# Patient Record
Sex: MALE | Race: WHITE | NOT HISPANIC OR LATINO | Employment: FULL TIME | URBAN - METROPOLITAN AREA
[De-identification: names, ages, dates, MRNs, and addresses within clinical notes are randomized per-mention and may not be internally consistent; named-entity substitution may affect disease eponyms.]

---

## 2017-05-11 ENCOUNTER — OFFICE VISIT (OUTPATIENT)
Dept: INTERNAL MEDICINE | Facility: CLINIC | Age: 47
End: 2017-05-11
Payer: COMMERCIAL

## 2017-05-11 VITALS
HEART RATE: 109 BPM | BODY MASS INDEX: 37.84 KG/M2 | DIASTOLIC BLOOD PRESSURE: 86 MMHG | TEMPERATURE: 98.5 F | WEIGHT: 255.5 LBS | SYSTOLIC BLOOD PRESSURE: 140 MMHG | HEIGHT: 69 IN | OXYGEN SATURATION: 94 %

## 2017-05-11 DIAGNOSIS — M54.50 ACUTE LEFT-SIDED LOW BACK PAIN WITHOUT SCIATICA: Primary | ICD-10-CM

## 2017-05-11 PROCEDURE — 99213 OFFICE O/P EST LOW 20 MIN: CPT | Performed by: NURSE PRACTITIONER

## 2017-05-11 RX ORDER — CYCLOBENZAPRINE HCL 10 MG
5-10 TABLET ORAL 3 TIMES DAILY PRN
Qty: 30 TABLET | Refills: 1 | Status: SHIPPED | OUTPATIENT
Start: 2017-05-11

## 2017-05-11 NOTE — PATIENT INSTRUCTIONS
Apply ice for 15-20 minutes intermittently as needed and especially after any offending activity; a bag of frozen vegetables works well for a cold pack.    May use heat alternating with cold if that helps it feel better    Warm moist heat, such as a shower or bath, may help relax muscles    Ibuprofen 600 mg - 800 mg every 8 hours for the next 5-7 days. Take with food. Maximum dose 2400 mg a day. Then as needed for pain    Flexeril up to 3 times a day as needed for pain   You need to rest after taking   Do not drive for 5-6 hours after taking medication     If your pain persists, would consider additional imaging      Consider Physical Therapy if symptoms not better with symptomatic care.    Follow up with worsening symptoms, failure to improve, or with any questions or concerns.

## 2017-05-11 NOTE — MR AVS SNAPSHOT
After Visit Summary   5/11/2017    Miguelangel Wren    MRN: [id number]           Patient Information     Date Of Birth          1970        Visit Information        Provider Department      5/11/2017 10:00 AM Carmelita Ayers APRN CNP [facility]        Care Instructions    Apply ice for 15-20 minutes intermittently as needed and especially after any offending activity; a bag of frozen vegetables works well for a cold pack.    May use heat alternating with cold if that helps it feel better    Warm moist heat, such as a shower or bath, may help relax muscles    Ibuprofen 600 mg - 800 mg every 8 hours for the next 5-7 days. Take with food. Maximum dose 2400 mg a day. Then as needed for pain    Flexeril up to 3 times a day as needed for pain   You need to rest after taking   Do not drive for 5-6 hours after taking medication     If your pain persists, would consider additional imaging      Consider Physical Therapy if symptoms not better with symptomatic care.    Follow up with worsening symptoms, failure to improve, or with any questions or concerns.               Follow-ups after your visit        Who to contact     If you have questions or need follow up information about today's clinic visit or your schedule please contact [facility] directly at [phone].  Normal or non-critical lab and imaging results will be communicated to you by MyChart, letter or phone within 4 business days after the clinic has received the results. If you do not hear from us within 7 days, please contact the clinic through MyChart or phone. If you have a critical or abnormal lab result, we will notify you by phone as soon as possible.  Submit refill requests through [employer] or call your pharmacy and they will forward the refill request to us. Please allow 3 business days for your refill to be completed.          Additional Information About Your Visit        MyChart Information      "[employer] lets you send messages to your doctor, view your test results, renew your prescriptions, schedule appointments and more. To sign up, go to [email or web address] . Click on \"Log in\" on the left side of the screen, which will take you to the Welcome page. Then click on \"Sign up Now\" on the right side of the page.     You will be asked to enter the access code listed below, as well as some personal information. Please follow the directions to create your username and password.     Your access code is: [email or web address]  Expires: 2017 11:00 AM     Your access code will  in 90 days. If you need help or a new code, please call your [facility] clinic or [phone].        Care EveryWhere ID     This is your [facility] EveryWhere ID. This could be used by other organizations to access your [facility] medical records  [id number]        Your Vitals Were     Pulse Temperature Height Pulse Oximetry BMI (Body Mass Index)       109 98.5  F (36.9  C) (Oral) 5' 9\" (1.753 m) 94% 37.73 kg/m2        Blood Pressure from Last 3 Encounters:   17 140/86   16 138/84   10/21/14 120/72    Weight from Last 3 Encounters:   17 255 lb 8 oz (115.9 kg)   16 263 lb (119.3 kg)   10/21/14 257 lb (116.6 kg)              Today, you had the following     No orders found for display       Primary Care Provider Office Phone # Fax #    Nam Perez [phone]       XXX RESIGNED  [address] [facility] [address]  [facility] [address]        Thank you!     Thank you for choosing [facility]  for your care. Our goal is always to provide you with excellent care. Hearing back from our patients is one way we can continue to improve our services. Please take a few minutes to complete the written survey that you may receive in the mail after your visit with us. Thank you!             Your Updated Medication List - Protect others around you: Learn how to safely use, store and throw away your " medicines at www.disposemymeds.org.          This list is accurate as of: 5/11/17 11:00 AM.  Always use your most recent med list.                   Brand Name Dispense Instructions for use    doxycycline 100 MG tablet    VIBRA-TABS    90 tablet    Take 1 tablet (100 mg) by mouth daily Pt uses prn flare ups

## 2017-05-11 NOTE — NURSING NOTE
"Chief Complaint   Patient presents with     Abdominal Pain     pt is having back pain but does not think it is coming from musular or skeletal been going on for 8 months      Musculoskeletal Problem       Initial /86 (BP Location: Left arm, Patient Position: Chair, Cuff Size: Adult Large)  Pulse 109  Temp 98.5  F (36.9  C) (Oral)  Ht 5' 9\" (1.753 m)  Wt 255 lb 8 oz (115.9 kg)  SpO2 94%  BMI 37.73 kg/m2 Estimated body mass index is 37.73 kg/(m^2) as calculated from the following:    Height as of this encounter: 5' 9\" (1.753 m).    Weight as of this encounter: 255 lb 8 oz (115.9 kg).  Medication Reconciliation: complete    "

## 2017-05-11 NOTE — PROGRESS NOTES
Chief Complaint   Patient presents with     Abdominal Pain     pt is having back pain but does not think it is coming from musular or skeletal been going on for 8 months      Musculoskeletal Problem       SUBJECTIVE:                                                    Miguelangel Wren is a 47 year old male who presents to clinic today for the following health issues:  Chief Complaint   Patient presents with     Abdominal Pain       pt is having back pain but does not think it is coming from musular or skeletal been going on for 8 months      Musculoskeletal Problem     [place] in [place] started having pain in right side  Tried ibuprofen and pepto bismol  Had eaten at mexican restaurant   Thought he had GB attack- had a gallstone  Ultrasound suggested - completed-  Gallstones an fat in liver   Pain in back left lower back side worse and then not so bad   Happens 6 times in last 8 months    Has salivary stone and gallstones and thinks he must have kidney stones     He will see chiropractor for pain as well      Problem list and histories reviewed & adjusted, as indicated.  Additional history: as documented    Patient Active Problem List   Diagnosis     Rosacea     CARDIOVASCULAR SCREENING; LDL GOAL LESS THAN 160     Past Surgical History:   Procedure Laterality Date     C NONSPECIFIC PROCEDURE      [place] teeth       Social History   Substance Use Topics     Smoking status: Former Smoker     Packs/day: 0.50     Years: 13.00     Types: Cigarettes     Quit date: 1/1/2012     Smokeless tobacco: Never Used     Alcohol use 0.0 oz/week     0 Standard drinks or equivalent per week      Comment: very little 1-2 beers per week     Family History   Problem Relation Age of Onset     Cardiovascular Father      CABG     CEREBROVASCULAR DISEASE Father            Reviewed and updated as needed this visit by clinical staff  Tobacco  Allergies  Meds  Med Hx  Surg Hx  Fam Hx  Soc Hx      Reviewed and  "updated as needed this visit by Provider         ROS:  Constitutional, HEENT, cardiovascular, pulmonary, GI, , musculoskeletal, neuro, skin, endocrine and psych systems are negative, except as otherwise noted.    OBJECTIVE:                                                    /86 (BP Location: Left arm, Patient Position: Chair, Cuff Size: Adult Large)  Pulse 109  Temp 98.5  F (36.9  C) (Oral)  Ht 5' 9\" (1.753 m)  Wt 255 lb 8 oz (115.9 kg)  SpO2 94%  BMI 37.73 kg/m2  Body mass index is 37.73 kg/(m^2).  GENERAL: alert and no distress  RESP: lungs clear   CV: regular rate and rhythm  ABDOMEN: soft, nontender, no hepatosplenomegaly, no masses and bowel sounds normal  MS: no gross musculoskeletal defects noted,   Tight paraspinal muscle left lumbar spine ; compression of same causes pain he is experiencing although he states he feels it is deeper when it is worse   NEURO: Normal strength and tone, mentation intact and speech normal  PSYCH: mentation appears normal, affect normal/bright    Diagnostic Test Results:  none      ASSESSMENT/PLAN:                                                            1. Acute left-sided low back pain without sciatica    - cyclobenzaprine (FLEXERIL) 10 MG tablet; Take 0.5-1 tablets (5-10 mg) by mouth 3 times daily as needed for muscle spasms  Dispense: 30 tablet; Refill: 1    Patient Instructions   Apply ice for 15-20 minutes intermittently as needed and especially after any offending activity; a bag of frozen vegetables works well for a cold pack.    May use heat alternating with cold if that helps it feel better    Warm moist heat, such as a shower or bath, may help relax muscles    Ibuprofen 600 mg - 800 mg every 8 hours for the next 5-7 days. Take with food. Maximum dose 2400 mg a day. Then as needed for pain    Flexeril up to 3 times a day as needed for pain   You need to rest after taking   Do not drive for 5-6 hours after taking medication     If your pain persists, would " consider additional imaging      Consider Physical Therapy if symptoms not better with symptomatic care.    Follow up with worsening symptoms, failure to improve, or with any questions or concerns.             SARAH Buckner [facility]

## 2022-04-12 ENCOUNTER — MEDICAL CORRESPONDENCE (OUTPATIENT)
Dept: HEALTH INFORMATION MANAGEMENT | Facility: CLINIC | Age: 52
End: 2022-04-12
Payer: COMMERCIAL

## 2022-04-13 ENCOUNTER — TRANSCRIBE ORDERS (OUTPATIENT)
Dept: OTHER | Age: 52
End: 2022-04-13
Payer: COMMERCIAL

## 2022-04-13 DIAGNOSIS — N32.1 COLOVESICAL FISTULA: Primary | ICD-10-CM

## 2022-04-14 ENCOUNTER — TELEPHONE (OUTPATIENT)
Dept: GASTROENTEROLOGY | Facility: CLINIC | Age: 52
End: 2022-04-14

## 2022-04-14 NOTE — CONFIDENTIAL NOTE
Advanced Endoscopy     Referring provider: Dr. Bruno Garcia @ [facility]  Ph: [phone] Fx: [phone]    Referred to: [facility]     Provider Requested: Sea     Referral Received: 4/13/22     Records received: Josejim    Impression:            - Seven polyps identified and                          removed. Four were greater than 1                          cm (AC, sigmoid x2, DC x2). All                          polyps fully removed. The site of                          the colovesicular fistula was not                          identified, but there were many                          diverticuli in the sigmoid and                          descending colon.                          - Normal mucosa in the entire                          examined colon.                          - Diverticulosis in the sigmoid                          colon and in the descending colon.                          - Four 4 to 11 mm polyps in the                          sigmoid colon (1), in the                          transverse colon (1) and in the                          ascending colon (2), removed with a                          cold snare. Resected and retrieved.                          - The examined portion of the ileum                          was normal.                          - Four 11 to 18 mm polyps in the                          sigmoid colon (2) and in the                          descending colon (2), removed with                           a hot snare. Resected and retrieved.                          - The examination was otherwise                          normal on direct and retroflexion                          views.                          - Non-bleeding internal hemorrhoids    Regarding colovesicular fistula,                          Miguelangel is interested in pursuing                          endoscopic management, if possible.                          Therefore, at his  request, I am                          referring him to Dr. Osei for                          evaluation of endoscopic closure of                          his colovesicular fistula with the                          assistance of urology.     IMPRESSION:   1. Small amount of anti--dependent air in the urinary bladder. Correlate for recent catheterization versus urinary tract infection. Additionally, there is a tiny area of sigmoid colon which abuts the bladder wall at the dome. While no definite fistula tract is identified, a small colovesicular fistula is not excluded, particularly given the patient's history of prior diverticulitis.   2. Other findings: Hepatic steatosis, cholelithiasis, and 4 mm nonobstructing left lower pole renal calculus     Images received: PACs    Evaluation for: endoscopy management with  for colovesicular fistula due to diverticulitis     Clinical History (per RN review):     MD review date:   MD Decision for clinic consultation/Orders:            Referral updates/Patient contacted:

## 2022-04-15 ENCOUNTER — PATIENT OUTREACH (OUTPATIENT)
Dept: GASTROENTEROLOGY | Facility: CLINIC | Age: 52
End: 2022-04-15
Payer: COMMERCIAL

## 2022-04-15 NOTE — CONFIDENTIAL NOTE
Called pt to discuss referral and Dr Osei's recommendations.     Looks like a colovesicular fistula. Looks like he saw Urology at [facility] and they visualized it on cysto, but I don't think they wanted to do anything about it.     Jannet, we can set up a clinic visit first and then if Dr. Duran agrees we'll have to find time to do a combo procedure.     Pt in agreement, will schedule virtual visit on 5/11 at 7am,  clinic coordinators messaged.    Vanessa Humphries, RN, BSN,   Advanced [facility]  Care coordinator

## 2022-04-18 ENCOUNTER — TRANSCRIBE ORDERS (OUTPATIENT)
Dept: OTHER | Age: 52
End: 2022-04-18
Payer: COMMERCIAL

## 2022-04-18 ENCOUNTER — TELEPHONE (OUTPATIENT)
Dept: GASTROENTEROLOGY | Facility: CLINIC | Age: 52
End: 2022-04-18
Payer: COMMERCIAL

## 2022-04-18 DIAGNOSIS — N32.1 COLOVESICAL FISTULA: Primary | ICD-10-CM

## 2022-04-18 NOTE — TELEPHONE ENCOUNTER
I called the patient to get his verbal consent for a virtual appointment he has in May. I left a voicemail for him to call back and do the consent form.

## 2022-04-30 ENCOUNTER — HEALTH MAINTENANCE LETTER (OUTPATIENT)
Age: 52
End: 2022-04-30

## 2022-05-02 NOTE — TELEPHONE ENCOUNTER
RECORDS STATUS - ALL OTHER DIAGNOSIS      RECORDS RECEIVED FROM: [facility]   DATE RECEIVED:    NOTES STATUS DETAILS   OFFICE NOTE from referring provider CE - [facility] Bruno Garcia MD    DISCHARGE SUMMARY from hospital CE - Andrea 17   DISCHARGE REPORT from the ER CE - Ute 16   OPERATIVE REPORT CE - HP 22: Colonoscopy   MEDICATION LIST CE [facility]   LABS     PATHOLOGY REPORTS [facility], Report in CE, slides requested   [employer]: [id number]   ANYTHING RELATED TO DIAGNOSIS Epic/CE 2/3/22   IMAGING (NEED IMAGES & REPORT)     CT SCANS PACS 2/3/22: [facility]

## 2022-05-11 ENCOUNTER — VIRTUAL VISIT (OUTPATIENT)
Dept: GASTROENTEROLOGY | Facility: CLINIC | Age: 52
End: 2022-05-11
Attending: INTERNAL MEDICINE
Payer: COMMERCIAL

## 2022-05-11 ENCOUNTER — PRE VISIT (OUTPATIENT)
Dept: GASTROENTEROLOGY | Facility: CLINIC | Age: 52
End: 2022-05-11
Payer: COMMERCIAL

## 2022-05-11 DIAGNOSIS — N32.1 COLOVESICAL FISTULA: ICD-10-CM

## 2022-05-11 PROCEDURE — 99204 OFFICE O/P NEW MOD 45 MIN: CPT | Mod: 95 | Performed by: INTERNAL MEDICINE

## 2022-05-11 RX ORDER — LOSARTAN POTASSIUM AND HYDROCHLOROTHIAZIDE 25; 100 MG/1; MG/1
1 TABLET ORAL DAILY
COMMUNITY
Start: 2022-04-05

## 2022-05-11 RX ORDER — LOSARTAN POTASSIUM 100 MG/1
100 TABLET ORAL DAILY
COMMUNITY
Start: 2022-03-05 | End: 2022-06-09

## 2022-05-11 RX ORDER — METFORMIN HCL 500 MG
2000 TABLET, EXTENDED RELEASE 24 HR ORAL
COMMUNITY
Start: 2022-05-03

## 2022-05-11 NOTE — NURSING NOTE
Patient confirms medications and allergies are accurate via patients echeck in completion, and or denies any changes since last reviewed/verified.     Ahmet Gray, Virtual Facilitator

## 2022-05-11 NOTE — LETTER
5/11/2022         RE: Miguelangel Wren  [address] [place] [address]        Dear Colleague,    Thank you for referring your patient, Miguelangel Wren, to the [facility]. Please see a copy of my visit note below.      INTERVENTIONAL ENDOSCOPY OUTPATIENT CLINIC CONSULT  DATE OF SERVICE: 5/11/2022  PROVIDER REQUESTING CONSULT: Dr. 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 I reviewed his outside CT scan and the colonoscopy and cystoscopy report. I explained that the fistula is most likely due to the diverticulitis. I explained that there are a couple different endoscopic ways to try to close this fistula with pretty good success rates. The main issue to to try to localize the fistula which is why I've done these interventions with the aid of Dr. Duran in urology who will do a simultaneous cystoscopy. She can then pass a wire from the bladder aspect across the fistula which I should be able to localize. Then I close the colonic aspect with an over the scope clip and she typically injects Matristim into the tract. I explained the risks/benefits of the procedure. He would like to proceed. I will discuss with Dr. Duran to see if she needs to see him in clinic as well and then we will coordinate a procedure together.      RECOMMENDATIONS:  - Combo colonoscopy/cystoscopy with fistula closure    Davidson Osei MD  [facility]  Division of Gastroenterology and Hepatology  [facility] [address], [place] [address]    ________________________________________________________________  HPI:  Miguelangel is a 52 year old male with a PMHx kidney stones and sigmoid diverticulitis in 2015 was referred for minimally invasive management of a colovesicular  fistula. Since November he has been noticing dark particles, sometimes dark blood, and air in his urine. He denies any dysuria, fevers, chills, flank pain, or suprapubic pain. Denies any diarrhea or blood in the stool. A CT in February showed sir in the bladder but no obvious fistula. A cystoscopy at [place] by Dr. Covington showed a crater-like lesion in the left lateral posterior wall suspected to be the site of the fistula. He then underwent a colonoscopy by Dr. Garcia. The fistula was not identified although multiple diverticuli were seen. Several polyps were resected. There was a brief period this winter/spring that the pneumaturia spontaneously resolved but has since returned. He denies any further episodes of diverticulitis. He takes psyllium daily and has been hydrating with at least a gallon of water daily mainly due to his kidney stone history.     PMHx:  Past Medical History:   Diagnosis Date     Rosacea      Patient Active Problem List   Diagnosis     Rosacea     CARDIOVASCULAR SCREENING; LDL GOAL LESS THAN 160       PSurgHx:  Past Surgical History:   Procedure Laterality Date     [facility] NONSPECIFIC PROCEDURE      [place] teeth       MEDS:  Current Outpatient Medications   Medication     metFORMIN (GLUCOPHAGE XR) 500 MG 24 hr tablet     cyclobenzaprine (FLEXERIL) 10 MG tablet     doxycycline (VIBRA-TABS) 100 MG tablet     losartan (COZAAR) 100 MG tablet     losartan-hydrochlorothiazide (HYZAAR) 100-25 MG tablet     No current facility-administered medications for this visit.     ALLERGIES:  No Known Allergies  FHx:  Family History   Problem Relation Age of Onset     Cardiovascular Father         CABG     Cerebrovascular Disease Father        SOCIAL Hx:  Social History     Socioeconomic History     Marital status:      Spouse name: Not on file     Number of children: Not on file     Years of education: Not on file     Highest education level: Not on file   Occupational History     Not on file   Tobacco Use      Smoking status: Former Smoker     Packs/day: 0.50     Years: 13.00     Pack years: 6.50     Types: Cigarettes     Quit date: 2012     Years since quitting: 10.3     Smokeless tobacco: Never Used   Substance and Sexual Activity     Alcohol use: Yes     Alcohol/week: 0.0 standard drinks     Comment: very little 1-2 beers per week     Drug use: No     Sexual activity: Yes   Other Topics Concern     Parent/sibling w/ CABG, MI or angioplasty before 65F 55M? Not Asked   Social History Narrative     Not on file     Social Determinants of Health     Financial Resource Strain: Not on file   Food Insecurity: Not on file   Transportation Needs: Not on file   Physical Activity: Not on file   Stress: Not on file   Social Connections: Not on file   Intimate Partner Violence: Not on file   Housing Stability: Not on file       ROS: A comprehensive Review of Systems was asked and answered in the negative unless specifically commented upon in the HPI    Physical Exam  Gen: A&Ox3, NAD  HEENT: Moist mucus membranes, no scleral icterus.  Lungs: no respiratory distress      LABS:  Transferred Records on 2016   Component Date Value Ref Range Status     Potassium 2016 4.1  mmol/L Final     ALT 2016 47  U/L Final     AST 2016 26  U/L Final     Creatinine 2016 0.95  mg/dL Final         [id number]   Formatting of this note might be different from the original.   IMPRESSION   COMPARISON:  None.     TECHNIQUE:  Images were obtained through the abdomen and pelvis following the administration of oral and 100 mL IOPAMIDOL 61 % IV SOLN contrast.     FINDINGS:     LOWER CHEST: Unremarkable.     BONES: Degenerative changes in the spine and pelvis.     ABDOMEN/PELVIS: Hepatic parenchyma is diffusely hypoattenuating. No focal liver lesion. Cholelithiasis without CT evidence of acute cholecystitis. Normal appearance of the spleen, pancreas, adrenal glands, and appendix. Small amount of anti--dependent air  in the urinary bladder. Along the bladder dome, there is a tiny area of the sigmoid colon which abuts the bladder wall (sagittal image 45). 4 mm nonobstructing left lower pole renal calculus. Small bilateral subcentimeter renal hypodensities are too small to characterize. No hydronephrosis. Normal ureters. No bowel obstruction or acute inflammatory change. Colonic diverticulosis. Tiny amount of fat in the right inguinal canal. No free fluid, free air, or bulky lymphadenopathy. Normal caliber aorta.     IMPRESSION:   1. Small amount of anti--dependent air in the urinary bladder. Correlate for recent catheterization versus urinary tract infection. Additionally, there is a tiny area of sigmoid colon which abuts the bladder wall at the dome. While no definite fistula tract is identified, a small colovesicular fistula is not excluded, particularly given the patient's history of prior diverticulitis.   2. Other findings: Hepatic steatosis, cholelithiasis, and 4 mm nonobstructing left lower pole renal calculus.      Endoscopies:  Patient Name: Miguelangel Wren   Procedure Date: 4/8/2022 11:05 AM   MRN: [id number]   Account #: [id number]   YOB: 1970   Admit Type: Outpatient   Age: 52   Note Status: Finalized   Attending MD: Bruno Garcia MD   Procedure:             Colonoscopy   Indications:           Diverticulitis, Follow-up of                          diverticulitis; has a colovesicular                          fistula, undergoing colonoscopy                          prior to mangement   Providers:             Bruno Garcia MD, America Ivey   Referring MD:          Duane Olson MD, Davidson Osei   Medicines:             Midazolam 5 mg IV, Fentanyl 125                          micrograms IV, CO2   Complications:         No immediate complications.                          Estimated blood loss: Minimal.   Procedure:             After I obtained informed consent,                           the scope was passed under direct                          vision. Throughout the procedure,                          the patient's blood pressure,                          pulse, and oxygen saturations were                          monitored continuously. The                          [id number] was introduced through                          the anus and advanced to 3 cm into                          the ileum. The colonoscopy was                          performed without difficulty. The                          patient tolerated the procedure                          well. The quality of the bowel                          preparation was good. The terminal                          ileum, ileocecal valve, appendiceal                          orifice, and rectum were                          photographed.   Findings:        Skin tags were found on perianal exam.        Normal mucosa was found in the entire colon.        Multiple small and large-mouthed diverticula were        found in the sigmoid colon and descending colon.        Four sessile polyps were found in the sigmoid colon,        transverse colon and ascending colon. The polyps were        4 to 11 mm in size. These polyps were removed with a        cold snare. Resection and retrieval were complete.        Estimated blood loss was minimal. Verification of        patient identification for the specimen was done by        the physician and nurse using the patient's name and        birth date.        The terminal ileum appeared normal.        Four semi-pedunculated polyps were found in the        sigmoid colon and descending colon. The polyps were        11 to 18 mm in size. These polyps were removed with a        hot snare. Resection and retrieval were complete.        Estimated blood loss: none. Verification of patient        identification for the specimen was done by the        physician and nurse using the patient's name and        birth  date.        The exam was otherwise without abnormality on direct        and retroflexion views.        Non-bleeding internal hemorrhoids were found during        retroflexion. The hemorrhoids were small.   Moderate Sedation:        Moderate (conscious) sedation was administered by the        endoscopy nurse and supervised by the endoscopist.        The following parameters were monitored: oxygen        saturation, heart rate, blood pressure, and response        to care. Total physician intraservice time was 40        minutes.   Impression:            - Seven polyps identified and                          removed. Four were greater than 1                          cm (AC, sigmoid x2, DC x2). All                          polyps fully removed. The site of                          the colovesicular fistula was not                          identified, but there were many                          diverticuli in the sigmoid and                          descending colon.                          - Normal mucosa in the entire                          examined colon.                          - Diverticulosis in the sigmoid                          colon and in the descending colon.                          - Four 4 to 11 mm polyps in the                          sigmoid colon (1), in the                          transverse colon (1) and in the                          ascending colon (2), removed with a                          cold snare. Resected and retrieved.                          - The examined portion of the ileum                          was normal.                          - Four 11 to 18 mm polyps in the                          sigmoid colon (2) and in the                          descending colon (2), removed with                           a hot snare. Resected and retrieved.                          - The examination was otherwise                          normal on direct and retroflexion                           views.                          - Non-bleeding internal hemorrhoids.   Recommendation:        - Discharge patient to home.                          - High fiber diet.                          - No ibuprofen, naproxen, or other                          non-steroidal anti-inflammatory                          drugs for 2 weeks after polyp                          removal.                          - Await pathology results.                          - Repeat colonoscopy in 3 years for                          surveillance based on pathology                          results.                          - Return to primary care physician.                          - Regarding colovesicular fistula,                          Miguelangel is interested in pursuing                          endoscopic management, if possible.                          Therefore, at his request, I am                          referring him to Dr. Osei for                          evaluation of endoscopic closure of                          his colovesicular fistula with the                          assistance of urology.         Sincerely,    Davidson Osei MD

## 2022-05-11 NOTE — PROGRESS NOTES
Miguelangel is a 52 year old who is being evaluated via a billable video visit.      How would you like to obtain your AVS? MyChart  If the video visit is dropped, the invitation should be resent by: Text to cell phone: [phone]   Will anyone else be joining your video visit? No      Video Start Time: 7:05 AM  Video-Visit Details    Type of service:  Video Visit    Video End Time:7:43 AM    Originating Location (pt. Location): Home    Distant Location (provider location):  [facility]     Platform used for Video Visit: Cristina     Ahmet Isaac    INTERVENTIONAL ENDOSCOPY OUTPATIENT CLINIC CONSULT  DATE OF SERVICE: 5/11/2022  PROVIDER REQUESTING CONSULT: Dr. 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 I reviewed his outside CT scan and the colonoscopy and cystoscopy report. I explained that the fistula is most likely due to the diverticulitis. I explained that there are a couple different endoscopic ways to try to close this fistula with pretty good success rates. The main issue to to try to localize the fistula which is why I've done these interventions with the aid of Dr. Duran in urology who will do a simultaneous cystoscopy. She can then pass a wire from the bladder aspect across the fistula which I should be able to localize. Then I close the colonic aspect with an over the scope clip and she typically injects Matristim into the tract. I explained the risks/benefits of the procedure. He would like to proceed. I will discuss with Dr. Duran to see if she needs to see him in clinic as well and then we will coordinate a procedure together.      RECOMMENDATIONS:  - Combo colonoscopy/cystoscopy with fistula closure    Davidson Osei MD  [facility]  Division of Gastroenterology  and Hepatology  [facility] [address] [place], [place] [address]    ________________________________________________________________  HPI:  Miguelangel is a 52 year old male with a PMHx kidney stones and sigmoid diverticulitis in 2015 was referred for minimally invasive management of a colovesicular fistula. Since November he has been noticing dark particles, sometimes dark blood, and air in his urine. He denies any dysuria, fevers, chills, flank pain, or suprapubic pain. Denies any diarrhea or blood in the stool. A CT in February showed sir in the bladder but no obvious fistula. A cystoscopy at [facility] by Dr. Covington showed a crater-like lesion in the left lateral posterior wall suspected to be the site of the fistula. He then underwent a colonoscopy by Dr. Garcia. The fistula was not identified although multiple diverticuli were seen. Several polyps were resected. There was a brief period this winter/spring that the pneumaturia spontaneously resolved but has since returned. He denies any further episodes of diverticulitis. He takes psyllium daily and has been hydrating with at least a gallon of water daily mainly due to his kidney stone history.     PMHx:  Past Medical History:   Diagnosis Date     Rosacea      Patient Active Problem List   Diagnosis     Rosacea     CARDIOVASCULAR SCREENING; LDL GOAL LESS THAN 160       PSurgHx:  Past Surgical History:   Procedure Laterality Date     [facility] NONSPECIFIC PROCEDURE      [place] teeth       MEDS:  Current Outpatient Medications   Medication     metFORMIN (GLUCOPHAGE XR) 500 MG 24 hr tablet     cyclobenzaprine (FLEXERIL) 10 MG tablet     doxycycline (VIBRA-TABS) 100 MG tablet     losartan (COZAAR) 100 MG tablet     losartan-hydrochlorothiazide (HYZAAR) 100-25 MG tablet     No current facility-administered medications for this visit.     ALLERGIES:  No Known Allergies  FHx:  Family History   Problem Relation Age of Onset     Cardiovascular Father         CABG      Cerebrovascular Disease Father        SOCIAL Hx:  Social History     Socioeconomic History     Marital status:      Spouse name: Not on file     Number of children: Not on file     Years of education: Not on file     Highest education level: Not on file   Occupational History     Not on file   Tobacco Use     Smoking status: Former Smoker     Packs/day: 0.50     Years: 13.00     Pack years: 6.50     Types: Cigarettes     Quit date: 2012     Years since quitting: 10.3     Smokeless tobacco: Never Used   Substance and Sexual Activity     Alcohol use: Yes     Alcohol/week: 0.0 standard drinks     Comment: very little 1-2 beers per week     Drug use: No     Sexual activity: Yes   Other Topics Concern     Parent/sibling w/ CABG, MI or angioplasty before 65F 55M? Not Asked   Social History Narrative     Not on file     Social Determinants of Health     Financial Resource Strain: Not on file   Food Insecurity: Not on file   Transportation Needs: Not on file   Physical Activity: Not on file   Stress: Not on file   Social Connections: Not on file   Intimate Partner Violence: Not on file   Housing Stability: Not on file       ROS: A comprehensive Review of Systems was asked and answered in the negative unless specifically commented upon in the HPI    Physical Exam  Gen: A&Ox3, NAD  HEENT: Moist mucus membranes, no scleral icterus.  Lungs: no respiratory distress      LABS:  Transferred Records on 2016   Component Date Value Ref Range Status     Potassium 2016 4.1  mmol/L Final     ALT 2016 47  U/L Final     AST 2016 26  U/L Final     Creatinine 2016 0.95  mg/dL Final         [id number]   Formatting of this note might be different from the original.   IMPRESSION   COMPARISON:  None.     TECHNIQUE:  Images were obtained through the abdomen and pelvis following the administration of oral and 100 mL IOPAMIDOL 61 % IV SOLN contrast.     FINDINGS:     LOWER CHEST: Unremarkable.      BONES: Degenerative changes in the spine and pelvis.     ABDOMEN/PELVIS: Hepatic parenchyma is diffusely hypoattenuating. No focal liver lesion. Cholelithiasis without CT evidence of acute cholecystitis. Normal appearance of the spleen, pancreas, adrenal glands, and appendix. Small amount of anti--dependent air in the urinary bladder. Along the bladder dome, there is a tiny area of the sigmoid colon which abuts the bladder wall (sagittal image 45). 4 mm nonobstructing left lower pole renal calculus. Small bilateral subcentimeter renal hypodensities are too small to characterize. No hydronephrosis. Normal ureters. No bowel obstruction or acute inflammatory change. Colonic diverticulosis. Tiny amount of fat in the right inguinal canal. No free fluid, free air, or bulky lymphadenopathy. Normal caliber aorta.     IMPRESSION:   1. Small amount of anti--dependent air in the urinary bladder. Correlate for recent catheterization versus urinary tract infection. Additionally, there is a tiny area of sigmoid colon which abuts the bladder wall at the dome. While no definite fistula tract is identified, a small colovesicular fistula is not excluded, particularly given the patient's history of prior diverticulitis.   2. Other findings: Hepatic steatosis, cholelithiasis, and 4 mm nonobstructing left lower pole renal calculus.      Endoscopies:  Patient Name: Miguelangel Wren   Procedure Date: 4/8/2022 11:05 AM   MRN: [id number]   Account #: [id number]   YOB: 1970   Admit Type: Outpatient   Age: 52   Note Status: Finalized   Attending MD: Bruno Garcia MD   Procedure:             Colonoscopy   Indications:           Diverticulitis, Follow-up of                          diverticulitis; has a colovesicular                          fistula, undergoing colonoscopy                          prior to mangement   Providers:             Bruno Garcia MD, America Ivey   Referring MD:          Duane Olson MD,  Davidson Osei   Medicines:             Midazolam 5 mg IV, Fentanyl 125                          micrograms IV, CO2   Complications:         No immediate complications.                          Estimated blood loss: Minimal.   Procedure:             After I obtained informed consent,                          the scope was passed under direct                          vision. Throughout the procedure,                          the patient's blood pressure,                          pulse, and oxygen saturations were                          monitored continuously. The                          [id number] was introduced through                          the anus and advanced to 3 cm into                          the ileum. The colonoscopy was                          performed without difficulty. The                          patient tolerated the procedure                          well. The quality of the bowel                          preparation was good. The terminal                          ileum, ileocecal valve, appendiceal                          orifice, and rectum were                          photographed.   Findings:        Skin tags were found on perianal exam.        Normal mucosa was found in the entire colon.        Multiple small and large-mouthed diverticula were        found in the sigmoid colon and descending colon.        Four sessile polyps were found in the sigmoid colon,        transverse colon and ascending colon. The polyps were        4 to 11 mm in size. These polyps were removed with a        cold snare. Resection and retrieval were complete.        Estimated blood loss was minimal. Verification of        patient identification for the specimen was done by        the physician and nurse using the patient's name and        birth date.        The terminal ileum appeared normal.        Four semi-pedunculated polyps were found in the        sigmoid colon and descending  colon. The polyps were        11 to 18 mm in size. These polyps were removed with a        hot snare. Resection and retrieval were complete.        Estimated blood loss: none. Verification of patient        identification for the specimen was done by the        physician and nurse using the patient's name and        birth date.        The exam was otherwise without abnormality on direct        and retroflexion views.        Non-bleeding internal hemorrhoids were found during        retroflexion. The hemorrhoids were small.   Moderate Sedation:        Moderate (conscious) sedation was administered by the        endoscopy nurse and supervised by the endoscopist.        The following parameters were monitored: oxygen        saturation, heart rate, blood pressure, and response        to care. Total physician intraservice time was 40        minutes.   Impression:            - Seven polyps identified and                          removed. Four were greater than 1                          cm (AC, sigmoid x2, DC x2). All                          polyps fully removed. The site of                          the colovesicular fistula was not                          identified, but there were many                          diverticuli in the sigmoid and                          descending colon.                          - Normal mucosa in the entire                          examined colon.                          - Diverticulosis in the sigmoid                          colon and in the descending colon.                          - Four 4 to 11 mm polyps in the                          sigmoid colon (1), in the                          transverse colon (1) and in the                          ascending colon (2), removed with a                          cold snare. Resected and retrieved.                          - The examined portion of the ileum                          was normal.                          - Four 11 to 18 mm  polyps in the                          sigmoid colon (2) and in the                          descending colon (2), removed with                           a hot snare. Resected and retrieved.                          - The examination was otherwise                          normal on direct and retroflexion                          views.                          - Non-bleeding internal hemorrhoids.   Recommendation:        - Discharge patient to home.                          - High fiber diet.                          - No ibuprofen, naproxen, or other                          non-steroidal anti-inflammatory                          drugs for 2 weeks after polyp                          removal.                          - Await pathology results.                          - Repeat colonoscopy in 3 years for                          surveillance based on pathology                          results.                          - Return to primary care physician.                          - Regarding colovesicular fistula,                          Mgiuelangel is interested in pursuing                          endoscopic management, if possible.                          Therefore, at his request, I am                          referring him to Dr. Osei for                          evaluation of endoscopic closure of                          his colovesicular fistula with the                          assistance of urology.        [facility]

## 2022-05-12 ENCOUNTER — PREP FOR PROCEDURE (OUTPATIENT)
Dept: GASTROENTEROLOGY | Facility: CLINIC | Age: 52
End: 2022-05-12
Payer: COMMERCIAL

## 2022-05-12 DIAGNOSIS — N32.1 COLOVESICAL FISTULA: Primary | ICD-10-CM

## 2022-05-18 ENCOUNTER — PATIENT OUTREACH (OUTPATIENT)
Dept: GASTROENTEROLOGY | Facility: CLINIC | Age: 52
End: 2022-05-18
Payer: COMMERCIAL

## 2022-05-18 ENCOUNTER — HOSPITAL ENCOUNTER (OUTPATIENT)
Facility: CLINIC | Age: 52
End: 2022-05-18
Attending: INTERNAL MEDICINE | Admitting: INTERNAL MEDICINE
Payer: COMMERCIAL

## 2022-05-18 DIAGNOSIS — N32.1 COLOVESICAL FISTULA: Primary | ICD-10-CM

## 2022-05-18 NOTE — CONFIDENTIAL NOTE
Called pt to discuss follow up to Dr Osei clinic. Left VM    Procedure/Imaging/Clinic: Colonoscopy/cystoscopy with fistula closure   Physician: Sea/Roger   Timing: next available   Procedure length: 60 min   Anesthesia: MAC   Dx: colovesicular fistula   Tier: 3   Location: [facility] OR   Date of 7/22 currently on hold, per Dr Duran's  she is available on Mondays and some Fridays.    Inquired with Dr Duran's schedulers if a clinic visit with her is needed prior to this procedure. Will follow up.    5/20/22 0930    Pt called back to discuss.  Explained they can expect a call from  for date and time of procedure, will need a , someone to stay with them for 24 hours and should stay in town for 24 hours (within 45 min of Hospital) post procedure    Patient needs to get pre-op physical completed. If outside  health system will need physical faxed to number [phone]   If you do not get a preop physical, your procedure could be cancelled, patient voiced understanding*    Preop Plan: PCP Dr Armstrong, at [facility], within 30 days of procedure    Med Review    Blood thinner -  none  ASA - none  Diabetic - metformin, discussed needing to hold in prep for procedure    COVID test discussed: yes, discussed needed with     Patient Education r/t procedure: mychart    NPO: Golytely, prescribed to pharmacy of patient's choice    A pre-op nurse will call 1-2 days prior to the procedure.    Verbalized understanding of all instructions. All questions answered.        Vanessa Humphries, RN, BSN,   [facility]  Care coordinator

## 2022-05-20 RX ORDER — BISACODYL 5 MG
TABLET, DELAYED RELEASE (ENTERIC COATED) ORAL
Qty: 4 TABLET | Refills: 0 | Status: SHIPPED | OUTPATIENT
Start: 2022-05-20

## 2022-05-23 ENCOUNTER — TELEPHONE (OUTPATIENT)
Dept: UROLOGY | Facility: CLINIC | Age: 52
End: 2022-05-23
Payer: COMMERCIAL

## 2022-05-24 ENCOUNTER — PRE VISIT (OUTPATIENT)
Dept: UROLOGY | Facility: CLINIC | Age: 52
End: 2022-05-24
Payer: COMMERCIAL

## 2022-05-24 NOTE — TELEPHONE ENCOUNTER
Reason for visit: Surgical consult - joint case    Relevant information: colovesical fistula    Records/imaging/labs/orders: records available    Pt called: no need for a call    At Rooming: collect a urine/pvr      Clari Robledo CMA  5/24/2022  9:17 AM

## 2022-06-09 ENCOUNTER — OFFICE VISIT (OUTPATIENT)
Dept: UROLOGY | Facility: CLINIC | Age: 52
End: 2022-06-09
Payer: COMMERCIAL

## 2022-06-09 ENCOUNTER — TELEPHONE (OUTPATIENT)
Dept: UROLOGY | Facility: CLINIC | Age: 52
End: 2022-06-09

## 2022-06-09 ENCOUNTER — OFFICE VISIT (OUTPATIENT)
Dept: UROLOGY | Facility: CLINIC | Age: 52
End: 2022-06-09

## 2022-06-09 VITALS
BODY MASS INDEX: 34.36 KG/M2 | HEART RATE: 91 BPM | SYSTOLIC BLOOD PRESSURE: 118 MMHG | DIASTOLIC BLOOD PRESSURE: 86 MMHG | WEIGHT: 240 LBS | HEIGHT: 70 IN

## 2022-06-09 DIAGNOSIS — N32.1 COLOVESICAL FISTULA: Primary | ICD-10-CM

## 2022-06-09 DIAGNOSIS — Z87.442 HISTORY OF KIDNEY STONES: ICD-10-CM

## 2022-06-09 PROCEDURE — 88112 CYTOPATH CELL ENHANCE TECH: CPT | Mod: 26 | Performed by: PATHOLOGY

## 2022-06-09 PROCEDURE — 88112 CYTOPATH CELL ENHANCE TECH: CPT | Mod: TC | Performed by: UROLOGY

## 2022-06-09 PROCEDURE — 99204 OFFICE O/P NEW MOD 45 MIN: CPT | Performed by: UROLOGY

## 2022-06-09 PROCEDURE — 99207 PR NO CHARGE NURSE ONLY: CPT

## 2022-06-09 ASSESSMENT — PAIN SCALES - GENERAL: PAINLEVEL: NO PAIN (0)

## 2022-06-09 NOTE — NURSING NOTE
"Chief Complaint   Patient presents with     Consult     colovesical fistula       Blood pressure 118/86, pulse 91, height 1.778 m (5' 10\"), weight 108.9 kg (240 lb). Body mass index is 34.44 kg/m .    Patient Active Problem List   Diagnosis     Rosacea     CARDIOVASCULAR SCREENING; LDL GOAL LESS THAN 160       No Known Allergies    Current Outpatient Medications   Medication Sig Dispense Refill     losartan-hydrochlorothiazide (HYZAAR) 100-25 MG tablet Take 1 tablet by mouth daily       metFORMIN (GLUCOPHAGE XR) 500 MG 24 hr tablet Take 2,000 mg by mouth       bisacodyl (DULCOLAX) 5 MG EC tablet Refer to \"Getting Ready for a Colonoscopy\" instruction handout (Patient not taking: Reported on 6/9/2022) 4 tablet 0     cyclobenzaprine (FLEXERIL) 10 MG tablet Take 0.5-1 tablets (5-10 mg) by mouth 3 times daily as needed for muscle spasms (Patient not taking: Reported on 6/9/2022) 30 tablet 1     doxycycline (VIBRA-TABS) 100 MG tablet Take 1 tablet (100 mg) by mouth daily Pt uses prn flare ups (Patient not taking: Reported on 6/9/2022) 90 tablet 3       Social History     Tobacco Use     Smoking status: Former Smoker     Packs/day: 0.50     Years: 13.00     Pack years: 6.50     Types: Cigarettes     Quit date: 1/1/2012     Years since quitting: 10.4     Smokeless tobacco: Never Used   Substance Use Topics     Alcohol use: Yes     Alcohol/week: 0.0 standard drinks     Comment: very little 1-2 beers per week     Drug use: No       Clari Robledo CMA  6/9/2022  1:10 PM     "

## 2022-06-09 NOTE — CONFIDENTIAL NOTE
See nurse note for more details and patient may call to reschedule his surgery to fall since he has no symptoms or concerns at this time.    Aleida Encarnacion, RN, BSN  Care Coordinator Urology

## 2022-06-09 NOTE — PROGRESS NOTES
June 9, 2022    Referring Provider: Dr Osei [facility]    Primary Care Provider: Kishan Orlando    Assessment & Plan     Colovesical fistula  Discussed his diveriticulitis is the likely etiology of his fistula I do recommend urine cytology given that he did have the hematuria and has not had this.    - Cytology non gyn    History of kidney stones    We discussed that Dr Osei and I have done a handful of these fistula cases together.  We discussed that the reason to do this together is that it is easier to find the fistula on the colon side.  We discussed that Dr Osei would place a clip on the colon side of the fistula and I would plan to inject matrisim (derived from porcine bladder cells) into the bladder to try to allow the bladder side to heal    We discussed that depending how things look I may suggest a catheter and that depending how things do he may also develop diverticulitis afterwards which could make things worse and then he would ultimately end up with a colon surgery anyway    He expressed understanding of this but is interested in trying a minimally invasive approach.  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    22 minutes were spent on this day of the encounter in reviewing the EMR including Dr Covington's note, Dr Garcia's note, Dr Osei's note, direct patient care including ordering a urine cytology, coordination of care and documentation    Itzel Duran MD MPH  (she/her/hers)   of Urology  [facility]    HPI:  Miguelangel Wren is a 52 year old male who presents for evaluation of a colovesical fistula.  This was first noted in the fall where he would have some particles in his urine, occasional blood and air.  He underwent  cystoscopy by Dr Covington (note from 2/9/2022 [facility] in [facility]) that showed a small defect in the posterior bladder wall.  He then underwent a colonoscopy by Dr Garcia (note from 4/8/2022 reviewed in [facility]) that was remarkable only for diverticula.    Kidney stone surgery in the past    Diverticulitis history    [employer], used play professionally    A colectomy was discussed but he had heard about a minimally invasive option and saw Dr Osei who referred him to see me.  (Note from 5/11/22 in Epic reviewed)    Currently he is having no urinary symptoms from this fistula.  He is tentatively scheduled for July but has a golf tournament the week after the procedure.    Past Medical History:   Diagnosis Date     Rosacea      Past Surgical History:   Procedure Laterality Date     [facility] NONSPECIFIC PROCEDURE      [place] teeth     Social History     Socioeconomic History     Marital status:      Spouse name: Not on file     Number of children: Not on file     Years of education: Not on file     Highest education level: Not on file   Occupational History     Not on file   Tobacco Use     Smoking status: Former Smoker     Packs/day: 0.50     Years: 13.00     Pack years: 6.50     Types: Cigarettes     Quit date: 1/1/2012     Years since quitting: 10.4     Smokeless tobacco: Never Used   Substance and Sexual Activity     Alcohol use: Yes     Alcohol/week: 0.0 standard drinks     Comment: very little 1-2 beers per week     Drug use: No     Sexual activity: Yes   Other Topics Concern     Parent/sibling w/ CABG, MI or angioplasty before 65F 55M? Not Asked   Social History Narrative     Not on file     Social Determinants of Health     Financial Resource Strain: Not on file   Food Insecurity: Not on file   Transportation Needs: Not on file   Physical Activity: Not on file   Stress: Not on file   Social Connections: Not on file   Intimate Partner Violence: Not on file   Housing Stability: Not on file  "    Family History   Problem Relation Age of Onset     Cardiovascular Father         CABG     Cerebrovascular Disease Father      ROS    No Known Allergies    Current Outpatient Medications   Medication     losartan-hydrochlorothiazide (HYZAAR) 100-25 MG tablet     metFORMIN (GLUCOPHAGE XR) 500 MG 24 hr tablet     bisacodyl (DULCOLAX) 5 MG EC tablet     cyclobenzaprine (FLEXERIL) 10 MG tablet     doxycycline (VIBRA-TABS) 100 MG tablet     No current facility-administered medications for this visit.   /86   Pulse 91   Ht 1.778 m (5' 10\")   Wt 108.9 kg (240 lb)   BMI 34.44 kg/m    GENERAL: healthy, alert and no distress  EYES: Eyes grossly normal to inspection, conjunctivae and sclerae normal  HENT: normal cephalic/atraumatic.  External ears, nose and mouth without ulcers or lesions.  RESP: no audible wheeze, cough, or visible cyanosis.  No visible retractions or increased work of breathing.  Able to speak fully in complete sentences.  NEURO: Cranial nerves grossly intact, mentation intact and speech normal  PSYCH: mentation appears normal, affect normal/bright, judgement and insight intact, normal speech and appearance well-groomed      CC  Patient Care Team:  Kishan Orlando as PCP - General (Family Medicine)  Davidson Osei MD as MD (Gastroenterology)  Bruno Garcia MD Brossard, Angela, RN as Registered Nurse  Davidson Osei MD as Assigned Gastroenterology Provider  SELF, REFERRED              "

## 2022-06-09 NOTE — TELEPHONE ENCOUNTER
Writer called and spoke to pt. Pt cannot have VV due to appt being a pre-surgical consult and needing PVR. Dr Duran was in emergency surgery and pt was informed but appt should be on time by then.

## 2022-06-09 NOTE — NURSING NOTE
Pre Op Teaching Flowsheet       Pre and Post op Patient Education  Relevant Diagnosis:  Colovesical fistula  Surgical procedure:  Cystoscopy/   Teaching Topic:  Pre and post op teaching  Person Involved in teaching: Yes    Motivation Level:  Asks Questions: Yes  Eager to Learn: Yes  Cooperative: Yes  Receptive (willing/able to accept information):  Yes    Patient demonstrates understanding of the following:  Date of surgery:  7/22/22  Location of surgery:  [address] [place] [address]  History and Physical and any other testing necessary prior to surgery: Yes  Required time line for completion of History and Physical and any pre-op testing: Yes    Patient demonstrates understanding of the following:  Pre-op bowel prep:  N/A  Pre-op showering/scrub information with PCMX Soap: Yes  Blood thinner medications discussed and when to stop (if applicable):  Yes  Discussed no visitor's at this time due to increase Covid-19 cases and how we need to make sure everyone stays safe.    Infection Prevention:   Patient demonstrates understanding of the following:  Surgical procedure site care taught: Yes  Signs and symptoms of infection taught: Yes      Post-op follow-up:  Discussed how to contact the hospital, nurse, and clinic scheduling staff if necessary. (See packet information)    Instructional materials used/given/mailed:  [facility] Packet, post op teaching sheet, Map, Soap, and with the arrival/location information to come closer to the surgery date.    Surgical instructions packet given to patient in office:  N/A    Follow up: Discussed arranging for someone to drive you home. ( No public transportation)  Someone needed to stay the first twenty hours after surgery: Yes     referral: none     home:  spouse    Care Giver:  spouse    PCP:  Darion

## 2022-06-09 NOTE — PATIENT INSTRUCTIONS
Surgery scheduler [phone]    Aleida RN Care Coordinator [phone]    It was a pleasure meeting with you today.  Thank you for allowing me and my team the privilege of caring for you today.  YOU are the reason we are here, and I truly hope we provided you with the excellent service you deserve.  Please let us know if there is anything else we can do for you so that we can be sure you are leaving completely satisfied with your care experience.

## 2022-06-09 NOTE — LETTER
6/9/2022       RE: Miguelangel Wren  [address] [place] [address]     Dear Colleague,    Thank you for referring your patient, Miguelangel Wren, to the [facility] [place] at [facility]. Please see a copy of my visit note below.    June 9, 2022    Referring Provider: Dr Osei [facility]    Primary Care Provider: Kishan Orlando    Assessment & Plan     Colovesical fistula  Discussed his diveriticulitis is the likely etiology of his fistula I do recommend urine cytology given that he did have the hematuria and has not had this.    - Cytology non gyn    History of kidney stones    We discussed that Dr Osei and I have done a handful of these fistula cases together.  We discussed that the reason to do this together is that it is easier to find the fistula on the colon side.  We discussed that Dr Osei would place a clip on the colon side of the fistula and I would plan to inject matrisim (derived from porcine bladder cells) into the bladder to try to allow the bladder side to heal    We discussed that depending how things look I may suggest a catheter and that depending how things do he may also develop diverticulitis afterwards which could make things worse and then he would ultimately end up with a colon surgery anyway    He expressed understanding of this but is interested in trying a minimally invasive approach.  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    22 minutes were spent on this day of the encounter in reviewing the EMR including Dr Covington's note, Dr Garcia's note, Dr Osei's note, direct patient care including ordering a urine cytology, coordination of care and  documentation    Itzel Duran MD MPH  (she/her/hers)   of Urology  [facility]    HPI:  Miguelangel Wren is a 52 year old male who presents for evaluation of a colovesical fistula.  This was first noted in the fall where he would have some particles in his urine, occasional blood and air.  He underwent cystoscopy by Dr Covington (note from 2/9/2022 reviewed in [facility]) that showed a small defect in the posterior bladder wall.  He then underwent a colonoscopy by Dr Garcia ([facility] that was remarkable only for diverticula.    Kidney stone surgery in the past    Diverticulitis history    [employer], used play professionally    A colectomy was discussed but he had heard about a minimally invasive option and saw Dr Osei who referred him to see me.  (Note from 5/11/22 in Epic reviewed)    Currently he is having no urinary symptoms from this fistula.  He is tentatively scheduled for July but has a golf tournament the week after the procedure.    Past Medical History:   Diagnosis Date     Rosacea      Past Surgical History:   Procedure Laterality Date     ZZC NONSPECIFIC PROCEDURE      Boss teeth     Social History     Socioeconomic History     Marital status:      Spouse name: Not on file     Number of children: Not on file     Years of education: Not on file     Highest education level: Not on file   Occupational History     Not on file   Tobacco Use     Smoking status: Former Smoker     Packs/day: 0.50     Years: 13.00     Pack years: 6.50     Types: Cigarettes     Quit date: 1/1/2012     Years since quitting: 10.4     Smokeless tobacco: Never Used   Substance and Sexual Activity     Alcohol use: Yes     Alcohol/week: 0.0 standard drinks     Comment: very little 1-2 beers per week     Drug use: No     Sexual activity: Yes   Other Topics Concern     Parent/sibling w/ CABG, MI or angioplasty before 65F 55M? Not Asked   Social History  "Narrative     Not on file     Social Determinants of Health     Financial Resource Strain: Not on file   Food Insecurity: Not on file   Transportation Needs: Not on file   Physical Activity: Not on file   Stress: Not on file   Social Connections: Not on file   Intimate Partner Violence: Not on file   Housing Stability: Not on file     Family History   Problem Relation Age of Onset     Cardiovascular Father         CABG     Cerebrovascular Disease Father      ROS    No Known Allergies    Current Outpatient Medications   Medication     losartan-hydrochlorothiazide (HYZAAR) 100-25 MG tablet     metFORMIN (GLUCOPHAGE XR) 500 MG 24 hr tablet     bisacodyl (DULCOLAX) 5 MG EC tablet     cyclobenzaprine (FLEXERIL) 10 MG tablet     doxycycline (VIBRA-TABS) 100 MG tablet     No current facility-administered medications for this visit.   /86   Pulse 91   Ht 1.778 m (5' 10\")   Wt 108.9 kg (240 lb)   BMI 34.44 kg/m    GENERAL: healthy, alert and no distress  EYES: Eyes grossly normal to inspection, conjunctivae and sclerae normal  HENT: normal cephalic/atraumatic.  External ears, nose and mouth without ulcers or lesions.  RESP: no audible wheeze, cough, or visible cyanosis.  No visible retractions or increased work of breathing.  Able to speak fully in complete sentences.  NEURO: Cranial nerves grossly intact, mentation intact and speech normal  PSYCH: mentation appears normal, affect normal/bright, judgement and insight intact, normal speech and appearance well-groomed      CC  Patient Care Team:  Kishan Orlando as PCP - General (Family Medicine)  Davidson Osei MD as MD (Gastroenterology)  Bruno Garcia MD Brossard, Angela, RN as Registered Nurse  Davidson Osei MD as Assigned Gastroenterology Provider  SELF, REFERRED    "

## 2022-06-10 LAB
PATH REPORT.COMMENTS IMP SPEC: NORMAL
PATH REPORT.FINAL DX SPEC: NORMAL
PATH REPORT.GROSS SPEC: NORMAL
PATH REPORT.RELEVANT HX SPEC: NORMAL

## 2022-11-20 ENCOUNTER — HEALTH MAINTENANCE LETTER (OUTPATIENT)
Age: 52
End: 2022-11-20

## 2022-11-29 DIAGNOSIS — N39.0 URINARY TRACT INFECTION: ICD-10-CM

## 2022-11-29 DIAGNOSIS — Z91.89 AT RISK OF UTI: Primary | ICD-10-CM

## 2022-12-16 DIAGNOSIS — N39.0 URINARY TRACT INFECTION: Primary | ICD-10-CM

## 2022-12-16 RX ORDER — SULFAMETHOXAZOLE/TRIMETHOPRIM 800-160 MG
1 TABLET ORAL 2 TIMES DAILY
Qty: 10 TABLET | Refills: 0 | Status: ON HOLD | OUTPATIENT
Start: 2022-12-16 | End: 2022-12-19

## 2022-12-16 NOTE — NURSING NOTE
Left detailed message for patient letting him know if bactrim does not cover the bacteria Dr Duran can not do the surgery. She does want to introduce the scope into an infected bladder possibly increasing the patient's infection. Called patient twice asking him to call me   Last time when called I left a detailed message    Aleida Encarnacion, RN, BSN  Care Coordinator Urology

## 2022-12-18 ENCOUNTER — TELEPHONE (OUTPATIENT)
Dept: UROLOGY | Facility: CLINIC | Age: 52
End: 2022-12-18

## 2022-12-18 ENCOUNTER — ANESTHESIA EVENT (OUTPATIENT)
Dept: SURGERY | Facility: CLINIC | Age: 52
End: 2022-12-18
Payer: COMMERCIAL

## 2022-12-18 ASSESSMENT — LIFESTYLE VARIABLES: TOBACCO_USE: 1

## 2022-12-18 NOTE — CONFIDENTIAL NOTE
December 18, 2022    Called patient this AM today discuss the fact that his urine culture has not resulted from the [facility] lab.  I explained that I have tried to get a hold of someone there and have been unable to find the urine culture results.      We furthermore discussed that even if he is not having symptoms my concern is that if there is an untreated urine culture in setting of trying a minimally invasive way to fix his fistula that we run risk of not only the procedure not working but also that he could get sick requiring hospitalization.  I discussed with him that it is unfortunate but meeting his deductible is not a reason to proceed without the urine culture results.    He will try to see if he can obtain the results from his end and call back to talk to someone on the urology team if he is able to.      Itzel Duran MD MPH  (she/her/hers)   of Urology  [facility]

## 2022-12-19 ENCOUNTER — ANESTHESIA (OUTPATIENT)
Dept: SURGERY | Facility: CLINIC | Age: 52
End: 2022-12-19
Payer: COMMERCIAL

## 2022-12-19 ENCOUNTER — TELEPHONE (OUTPATIENT)
Dept: UROLOGY | Facility: CLINIC | Age: 52
End: 2022-12-19

## 2022-12-19 ENCOUNTER — APPOINTMENT (OUTPATIENT)
Dept: GENERAL RADIOLOGY | Facility: CLINIC | Age: 52
End: 2022-12-19
Attending: INTERNAL MEDICINE
Payer: COMMERCIAL

## 2022-12-19 ENCOUNTER — HOSPITAL ENCOUNTER (OUTPATIENT)
Facility: CLINIC | Age: 52
Discharge: HOME | End: 2022-12-19
Attending: INTERNAL MEDICINE | Admitting: INTERNAL MEDICINE
Payer: COMMERCIAL

## 2022-12-19 VITALS
BODY MASS INDEX: 34.09 KG/M2 | WEIGHT: 238.1 LBS | TEMPERATURE: 98.7 F | OXYGEN SATURATION: 97 % | HEART RATE: 64 BPM | SYSTOLIC BLOOD PRESSURE: 124 MMHG | RESPIRATION RATE: 16 BRPM | DIASTOLIC BLOOD PRESSURE: 82 MMHG | HEIGHT: 70 IN

## 2022-12-19 DIAGNOSIS — N32.1 COLOVESICAL FISTULA: Primary | ICD-10-CM

## 2022-12-19 LAB — GLUCOSE BLDC GLUCOMTR-MCNC: 196 MG/DL (ref 70–99)

## 2022-12-19 PROCEDURE — 710N000010 HC RECOVERY PHASE 1, LEVEL 2, PER MIN: Performed by: INTERNAL MEDICINE

## 2022-12-19 PROCEDURE — 258N000003 HC RX IP 258 OP 636: Performed by: NURSE ANESTHETIST, CERTIFIED REGISTERED

## 2022-12-19 PROCEDURE — 82962 GLUCOSE BLOOD TEST: CPT

## 2022-12-19 PROCEDURE — 272N000001 HC OR GENERAL SUPPLY STERILE: Performed by: INTERNAL MEDICINE

## 2022-12-19 PROCEDURE — 51600 INJECTION FOR BLADDER X-RAY: CPT | Mod: GC | Performed by: UROLOGY

## 2022-12-19 PROCEDURE — 250N000011 HC RX IP 250 OP 636: Performed by: NURSE ANESTHETIST, CERTIFIED REGISTERED

## 2022-12-19 PROCEDURE — 360N000075 HC SURGERY LEVEL 2, PER MIN: Performed by: INTERNAL MEDICINE

## 2022-12-19 PROCEDURE — 52000 CYSTOURETHROSCOPY: CPT | Mod: GC | Performed by: UROLOGY

## 2022-12-19 PROCEDURE — 370N000017 HC ANESTHESIA TECHNICAL FEE, PER MIN: Performed by: INTERNAL MEDICINE

## 2022-12-19 PROCEDURE — 999N000179 XR SURGERY CARM FLUORO LESS THAN 5 MIN W STILLS: Mod: TC

## 2022-12-19 PROCEDURE — 710N000012 HC RECOVERY PHASE 2, PER MINUTE: Performed by: INTERNAL MEDICINE

## 2022-12-19 PROCEDURE — 250N000025 HC SEVOFLURANE, PER MIN: Performed by: INTERNAL MEDICINE

## 2022-12-19 PROCEDURE — 250N000011 HC RX IP 250 OP 636: Performed by: UROLOGY

## 2022-12-19 PROCEDURE — C1769 GUIDE WIRE: HCPCS | Performed by: INTERNAL MEDICINE

## 2022-12-19 PROCEDURE — 74430 CONTRAST X-RAY BLADDER: CPT | Mod: 26 | Performed by: UROLOGY

## 2022-12-19 PROCEDURE — 250N000009 HC RX 250: Performed by: NURSE ANESTHETIST, CERTIFIED REGISTERED

## 2022-12-19 PROCEDURE — 999N000141 HC STATISTIC PRE-PROCEDURE NURSING ASSESSMENT: Performed by: INTERNAL MEDICINE

## 2022-12-19 RX ORDER — SODIUM CHLORIDE, SODIUM LACTATE, POTASSIUM CHLORIDE, CALCIUM CHLORIDE 600; 310; 30; 20 MG/100ML; MG/100ML; MG/100ML; MG/100ML
INJECTION, SOLUTION INTRAVENOUS CONTINUOUS
Status: DISCONTINUED | OUTPATIENT
Start: 2022-12-19 | End: 2022-12-19 | Stop reason: HOSPADM

## 2022-12-19 RX ORDER — PROCHLORPERAZINE MALEATE 5 MG
10 TABLET ORAL EVERY 6 HOURS PRN
Status: DISCONTINUED | OUTPATIENT
Start: 2022-12-19 | End: 2022-12-19 | Stop reason: HOSPADM

## 2022-12-19 RX ORDER — ONDANSETRON 2 MG/ML
4 INJECTION INTRAMUSCULAR; INTRAVENOUS EVERY 6 HOURS PRN
Status: DISCONTINUED | OUTPATIENT
Start: 2022-12-19 | End: 2022-12-19 | Stop reason: HOSPADM

## 2022-12-19 RX ORDER — NALOXONE HYDROCHLORIDE 0.4 MG/ML
0.4 INJECTION, SOLUTION INTRAMUSCULAR; INTRAVENOUS; SUBCUTANEOUS
Status: DISCONTINUED | OUTPATIENT
Start: 2022-12-19 | End: 2022-12-19 | Stop reason: HOSPADM

## 2022-12-19 RX ORDER — NALOXONE HYDROCHLORIDE 0.4 MG/ML
0.2 INJECTION, SOLUTION INTRAMUSCULAR; INTRAVENOUS; SUBCUTANEOUS
Status: DISCONTINUED | OUTPATIENT
Start: 2022-12-19 | End: 2022-12-19 | Stop reason: HOSPADM

## 2022-12-19 RX ORDER — FENTANYL CITRATE 50 UG/ML
25 INJECTION, SOLUTION INTRAMUSCULAR; INTRAVENOUS
Status: DISCONTINUED | OUTPATIENT
Start: 2022-12-19 | End: 2022-12-19 | Stop reason: HOSPADM

## 2022-12-19 RX ORDER — ONDANSETRON 4 MG/1
4 TABLET, ORALLY DISINTEGRATING ORAL EVERY 30 MIN PRN
Status: DISCONTINUED | OUTPATIENT
Start: 2022-12-19 | End: 2022-12-19 | Stop reason: HOSPADM

## 2022-12-19 RX ORDER — ESMOLOL HYDROCHLORIDE 10 MG/ML
INJECTION INTRAVENOUS PRN
Status: DISCONTINUED | OUTPATIENT
Start: 2022-12-19 | End: 2022-12-19

## 2022-12-19 RX ORDER — ONDANSETRON 2 MG/ML
4 INJECTION INTRAMUSCULAR; INTRAVENOUS EVERY 30 MIN PRN
Status: DISCONTINUED | OUTPATIENT
Start: 2022-12-19 | End: 2022-12-19 | Stop reason: HOSPADM

## 2022-12-19 RX ORDER — ONDANSETRON 2 MG/ML
INJECTION INTRAMUSCULAR; INTRAVENOUS PRN
Status: DISCONTINUED | OUTPATIENT
Start: 2022-12-19 | End: 2022-12-19

## 2022-12-19 RX ORDER — ONDANSETRON 4 MG/1
4 TABLET, ORALLY DISINTEGRATING ORAL EVERY 6 HOURS PRN
Status: DISCONTINUED | OUTPATIENT
Start: 2022-12-19 | End: 2022-12-19 | Stop reason: HOSPADM

## 2022-12-19 RX ORDER — HYDROMORPHONE HYDROCHLORIDE 1 MG/ML
0.4 INJECTION, SOLUTION INTRAMUSCULAR; INTRAVENOUS; SUBCUTANEOUS EVERY 5 MIN PRN
Status: DISCONTINUED | OUTPATIENT
Start: 2022-12-19 | End: 2022-12-19 | Stop reason: HOSPADM

## 2022-12-19 RX ORDER — SODIUM CHLORIDE, SODIUM LACTATE, POTASSIUM CHLORIDE, CALCIUM CHLORIDE 600; 310; 30; 20 MG/100ML; MG/100ML; MG/100ML; MG/100ML
INJECTION, SOLUTION INTRAVENOUS CONTINUOUS PRN
Status: DISCONTINUED | OUTPATIENT
Start: 2022-12-19 | End: 2022-12-19

## 2022-12-19 RX ORDER — PROPOFOL 10 MG/ML
INJECTION, EMULSION INTRAVENOUS PRN
Status: DISCONTINUED | OUTPATIENT
Start: 2022-12-19 | End: 2022-12-19

## 2022-12-19 RX ORDER — HYDROMORPHONE HYDROCHLORIDE 1 MG/ML
0.2 INJECTION, SOLUTION INTRAMUSCULAR; INTRAVENOUS; SUBCUTANEOUS EVERY 5 MIN PRN
Status: DISCONTINUED | OUTPATIENT
Start: 2022-12-19 | End: 2022-12-19 | Stop reason: HOSPADM

## 2022-12-19 RX ORDER — FENTANYL CITRATE 50 UG/ML
25 INJECTION, SOLUTION INTRAMUSCULAR; INTRAVENOUS EVERY 5 MIN PRN
Status: DISCONTINUED | OUTPATIENT
Start: 2022-12-19 | End: 2022-12-19 | Stop reason: HOSPADM

## 2022-12-19 RX ORDER — FLUMAZENIL 0.1 MG/ML
0.2 INJECTION, SOLUTION INTRAVENOUS
Status: DISCONTINUED | OUTPATIENT
Start: 2022-12-19 | End: 2022-12-19 | Stop reason: HOSPADM

## 2022-12-19 RX ORDER — LIDOCAINE 40 MG/G
CREAM TOPICAL
Status: DISCONTINUED | OUTPATIENT
Start: 2022-12-19 | End: 2022-12-19 | Stop reason: HOSPADM

## 2022-12-19 RX ORDER — FENTANYL CITRATE 50 UG/ML
50 INJECTION, SOLUTION INTRAMUSCULAR; INTRAVENOUS EVERY 5 MIN PRN
Status: DISCONTINUED | OUTPATIENT
Start: 2022-12-19 | End: 2022-12-19 | Stop reason: HOSPADM

## 2022-12-19 RX ORDER — MEPERIDINE HYDROCHLORIDE 25 MG/ML
12.5 INJECTION INTRAMUSCULAR; INTRAVENOUS; SUBCUTANEOUS
Status: DISCONTINUED | OUTPATIENT
Start: 2022-12-19 | End: 2022-12-19 | Stop reason: HOSPADM

## 2022-12-19 RX ORDER — PROPOFOL 10 MG/ML
INJECTION, EMULSION INTRAVENOUS CONTINUOUS PRN
Status: DISCONTINUED | OUTPATIENT
Start: 2022-12-19 | End: 2022-12-19

## 2022-12-19 RX ORDER — LIDOCAINE HYDROCHLORIDE 20 MG/ML
INJECTION, SOLUTION INFILTRATION; PERINEURAL PRN
Status: DISCONTINUED | OUTPATIENT
Start: 2022-12-19 | End: 2022-12-19

## 2022-12-19 RX ORDER — CEFAZOLIN SODIUM 1 G/3ML
INJECTION, POWDER, FOR SOLUTION INTRAMUSCULAR; INTRAVENOUS PRN
Status: DISCONTINUED | OUTPATIENT
Start: 2022-12-19 | End: 2022-12-19

## 2022-12-19 RX ORDER — SULFAMETHOXAZOLE/TRIMETHOPRIM 800-160 MG
1 TABLET ORAL 2 TIMES DAILY
Qty: 6 TABLET | Refills: 0 | Status: SHIPPED | OUTPATIENT
Start: 2022-12-19 | End: 2022-12-22

## 2022-12-19 RX ORDER — FENTANYL CITRATE 50 UG/ML
INJECTION, SOLUTION INTRAMUSCULAR; INTRAVENOUS PRN
Status: DISCONTINUED | OUTPATIENT
Start: 2022-12-19 | End: 2022-12-19

## 2022-12-19 RX ADMIN — SUCCINYLCHOLINE CHLORIDE 20 MG: 20 INJECTION, SOLUTION INTRAMUSCULAR; INTRAVENOUS; PARENTERAL at 09:12

## 2022-12-19 RX ADMIN — SODIUM CHLORIDE, POTASSIUM CHLORIDE, SODIUM LACTATE AND CALCIUM CHLORIDE: 600; 310; 30; 20 INJECTION, SOLUTION INTRAVENOUS at 07:40

## 2022-12-19 RX ADMIN — PROPOFOL 50 MG: 10 INJECTION, EMULSION INTRAVENOUS at 09:12

## 2022-12-19 RX ADMIN — PROPOFOL 50 MG: 10 INJECTION, EMULSION INTRAVENOUS at 09:09

## 2022-12-19 RX ADMIN — PROPOFOL 30 MG: 10 INJECTION, EMULSION INTRAVENOUS at 08:49

## 2022-12-19 RX ADMIN — ESMOLOL HYDROCHLORIDE 50 MG: 10 INJECTION, SOLUTION INTRAVENOUS at 08:54

## 2022-12-19 RX ADMIN — MIDAZOLAM 2 MG: 1 INJECTION INTRAMUSCULAR; INTRAVENOUS at 07:36

## 2022-12-19 RX ADMIN — PHENYLEPHRINE HYDROCHLORIDE 200 MCG: 10 INJECTION INTRAVENOUS at 09:45

## 2022-12-19 RX ADMIN — ESMOLOL HYDROCHLORIDE 30 MG: 10 INJECTION, SOLUTION INTRAVENOUS at 08:44

## 2022-12-19 RX ADMIN — FENTANYL CITRATE 50 MCG: 50 INJECTION, SOLUTION INTRAMUSCULAR; INTRAVENOUS at 07:44

## 2022-12-19 RX ADMIN — PROPOFOL 50 MG: 10 INJECTION, EMULSION INTRAVENOUS at 08:02

## 2022-12-19 RX ADMIN — ONDANSETRON 4 MG: 2 INJECTION INTRAMUSCULAR; INTRAVENOUS at 08:09

## 2022-12-19 RX ADMIN — PROPOFOL 200 MG: 10 INJECTION, EMULSION INTRAVENOUS at 09:05

## 2022-12-19 RX ADMIN — SODIUM CHLORIDE, POTASSIUM CHLORIDE, SODIUM LACTATE AND CALCIUM CHLORIDE: 600; 310; 30; 20 INJECTION, SOLUTION INTRAVENOUS at 09:00

## 2022-12-19 RX ADMIN — LIDOCAINE HYDROCHLORIDE 40 MG: 20 INJECTION, SOLUTION INFILTRATION; PERINEURAL at 07:44

## 2022-12-19 RX ADMIN — ESMOLOL HYDROCHLORIDE 20 MG: 10 INJECTION, SOLUTION INTRAVENOUS at 08:39

## 2022-12-19 RX ADMIN — HYDROMORPHONE HYDROCHLORIDE 0.5 MG: 1 INJECTION, SOLUTION INTRAMUSCULAR; INTRAVENOUS; SUBCUTANEOUS at 09:39

## 2022-12-19 RX ADMIN — CEFAZOLIN 2 G: 1 INJECTION, POWDER, FOR SOLUTION INTRAMUSCULAR; INTRAVENOUS at 07:50

## 2022-12-19 RX ADMIN — PHENYLEPHRINE HYDROCHLORIDE 100 MCG: 10 INJECTION INTRAVENOUS at 09:57

## 2022-12-19 RX ADMIN — FENTANYL CITRATE 25 MCG: 50 INJECTION, SOLUTION INTRAMUSCULAR; INTRAVENOUS at 08:34

## 2022-12-19 RX ADMIN — PROPOFOL 100 MCG/KG/MIN: 10 INJECTION, EMULSION INTRAVENOUS at 07:44

## 2022-12-19 RX ADMIN — PROPOFOL 50 MG: 10 INJECTION, EMULSION INTRAVENOUS at 07:44

## 2022-12-19 RX ADMIN — FENTANYL CITRATE 25 MCG: 50 INJECTION, SOLUTION INTRAMUSCULAR; INTRAVENOUS at 08:25

## 2022-12-19 ASSESSMENT — ACTIVITIES OF DAILY LIVING (ADL)
ADLS_ACUITY_SCORE: 18

## 2022-12-19 NOTE — BRIEF OP NOTE
[facility]    Brief Operative Note  Miguelangel Wren is a 52 year old female with a PMHx of Sigmoid diverticulitis complicated by colovesicular fistula who presents for combo colonoscopy/cystoscopy with fistula closure. CT AP in 2/2022 showed air in the urinary bladder concerning for colovesicular fistula. He had a colonoscopy in 4/2022 in which multiple polyps were removed and also showed significant diverticulosis in the sigmoid colon and in the descending colon but didn't identify site for colovesicular fistula.     Pre-operative diagnosis: Colovesical fistula [N32.1]  Post-operative diagnosis Same as pre-operative diagnosis    Procedure: Procedure(s):  SIGMOIDOSCOPY, FLEXIBLE  CYSTOSCOPY WITH CYSTOGRAM  Surgeon: Surgeon(s) and Role:  Panel 1:     * Davidson Osei MD - Primary     * Greg Bonds MD - Fellow - Assisting  Panel 2:     * Itzel Duran MD - Primary  Anesthesia: General   Estimated Blood Loss: None    Drains: None  Specimens: * No specimens in log *  Findings:     - Patient underwent a flexible sigmoidoscopy and showed severe small and large mouth diverticulosis in the descending and sigmoid colon   - Procedure was done in tandem with urology to help visualize/locate the fistula from the bladder side, however, both cystoscopy and colonoscopy were unable to visualize the fistula  - A methylene blue was injected into the bladder to see to help visualize the fistula tract endoscopically but this was also unsuccessful in visualizing the tract endoscopically. There was no visible methylene blue endoscopically  - A contrast was then injected into the bladder and examined under fluoroscopy and did not show any contrast extravasation into the colon  Complications: None.  Implants: * No implants in log *      Recommendations  - Observe patient in same day observation unit for ongoing care with plans for discharge to home later today   - Will have  patient follow up with urology for further recommendations   - The findings and recommendations were discussed with the patient and their family

## 2022-12-19 NOTE — PROGRESS NOTES
Pt ok for discharge per Urology Dr. Duran. Urology to follow up w/ patient later today via phone call.

## 2022-12-19 NOTE — OP NOTE
OPERATIVE REPORT  12/19/22    PREOPERATIVE DIAGNOSIS: Pneumaturia     POSTOPERATIVE DIAGNOSIS: Same    PROCEDURES PERFORMED:   1. Cystourethroscopy  2. Cystogram     STAFF SURGEON: Itzel Duran MD  RESIDENT(S):  Subhash Wills MD    ANESTHESIA: General    ESTIMATED BLOOD LOSS: < 10 mL.     DRAINS: None    OPERATIVE INDICATIONS:   Miguelangel Wren is a 52 year old male with concern for colovesical fistula. The patient first noted some particles in his urine with occasional air and blood. He had a cystoscopy on 2/9/22 with Dr. Powell that showed a small defect in the posterior bladder wall. He had a colonoscopy by Dr. Garcia that was remarkable only for diverticula so diverticulitis was the presumed etiology. He is having no urinary symptoms. He would prefer a minimally invasive approach. The case was discussed with GI who would attempt to perform a colonoscopy with clip from the colon side at the time of cystoscopy. The patient elected for biopsy and maximal fulguration, after a discussion of all risks, benefits, and alternatives.    DESCRIPTION OF PROCEDURE:   After verification of informed consent was obtained, the patient was brought to the operating room, and moved to the operating table. After adequate anesthesia was induced, the patient was repositioned in dorsal lithotomy position with care in neural safety in positioning all four extremities.  He was then prepped and draped in the usual sterile fashion. A formal timeout was performed to confirm the correct patient, procedure and operative site.     A 22-Yoruba rigid cystoscope was inserted into a well lubricated urethra. We began by performing a white light cystourethroscopy. The urethra was unremarkable with a long prostatic urethra. The ureteral orifices were in their orthotopic positions bilaterally. There were no intravesical stones or diverticuli and the bladder was moderately trabeculated. There were no clear fistula sites.     At this point, GI performed  a colonoscopy using pneumo to try and produce bubbles within the bladder to localize the fistula. However, after GI scoped the concerning portion of bowel (please see GI's note for full details) no bubbles were seen. Next we placed a 5-Fr open ended catheter through the scope and then a sensor wire through the 5-Fr. We probed a several suspicious looking lesions and diverticula within the bladder, but the wire was unable to cannulate any of them.     Next, we instilled methylene blue through the cystoscope and into the bladder with hopes that this would travel into the colon side for GI to identify with their colonoscopy. However, GI was unable to locate methylene blue.     At this point, we then performed a cystogram to hopefully identify the fistula using fluoroscopic guidance. However, no fistula was seen when the bladder was filled with contrast or on voiding.     GI then attempted to identify the fistula under fluoroscopic guidance using prior CT imaging as a guide. However, again the fistula was unable to be identified. At this point the scopes were removed and the procedure was ended at it had been over 2 hours of attempted to identify the fistula.     A 16Fr urethral catheter was placed into the bladder as the patient had minor prostatic bleeding and 10ml was instilled into the balloon. It was connected to drainage and secured to the patient's leg. The patient was transferred to the postanesthesia care unit in stable condition and tolerated the procedure well.    POSTOP PLAN:  1. PACU then discharge home  2. Bactrim for 3 days   3. OTC pain medications  4. Follow-up in urology clinic on 12/21/22 for TOV   5. Follow-up with urology as needed     Addendum:    I, Itzel Duran, was present and scrubbed for the entire case.  I agree with the note as above with changes made as needed. I spoke to his wife over the telephone later in the day.    Itzel Duran MD MPH  (she/her/hers)   of  Urology  [facility]

## 2022-12-19 NOTE — OR NURSING
Dr. Osei at bedside in PACU. Does not need to see again in phase 2. Urology still to see before discharge.

## 2022-12-19 NOTE — PROGRESS NOTES
Pt stable for discharge, switched standard baker bag for leg bag, pt awaiting transportation via spouse.

## 2022-12-19 NOTE — ANESTHESIA PREPROCEDURE EVALUATION
Anesthesia Pre-Procedure Evaluation    Patient: Miguelangel Wren   MRN: [id number] : 1970        Procedure : Procedure(s):  COLONOSCOPY  CYSTOSCOPY with fistula closure          Past Medical History:   Diagnosis Date     Rosacea       Past Surgical History:   Procedure Laterality Date     ZZC NONSPECIFIC PROCEDURE      Palmer teeth      No Known Allergies   Social History     Tobacco Use     Smoking status: Former     Packs/day: 0.50     Years: 13.00     Pack years: 6.50     Types: Cigarettes     Quit date: 2012     Years since quitting: 10.9     Smokeless tobacco: Never   Substance Use Topics     Alcohol use: Yes     Alcohol/week: 0.0 standard drinks     Comment: very little 1-2 beers per week      Wt Readings from Last 1 Encounters:   22 108.9 kg (240 lb)        Anesthesia Evaluation            ROS/MED HX  ENT/Pulmonary:     (+) tobacco use (quit ), Past use,     Neurologic:       Cardiovascular:       METS/Exercise Tolerance:     Hematologic:       Musculoskeletal:       GI/Hepatic: Comment: Sigmoid diverticulitis     Colovesicular fistula       Renal/Genitourinary:       Endo:     (+) Obesity,     Psychiatric/Substance Use:       Infectious Disease:       Malignancy:       Other: Comment: Rosacea           Physical Exam    Airway        Mallampati: III   TM distance: > 3 FB   Neck ROM: full   Mouth opening: > 3 cm    Respiratory Devices and Support         Dental  no notable dental history         Cardiovascular   cardiovascular exam normal          Pulmonary                   OUTSIDE LABS:  CBC:   Lab Results   Component Value Date    WBC 7.3 10/21/2014    WBC 11.2 (H) 2013    HGB 14.8 10/21/2014    HGB 15.9 2013    HCT 44.2 10/21/2014    HCT 47.3 2013     10/21/2014     2013     BMP:   Lab Results   Component Value Date     10/21/2014     2013    POTASSIUM 4.1 2016    POTASSIUM 3.9 10/21/2014    CHLORIDE 109 10/21/2014     CHLORIDE 105 04/29/2013    CO2 26 10/21/2014    CO2 25 04/29/2013    BUN 10 10/21/2014    BUN 18 04/29/2013    CR 0.95 07/11/2016    CR 0.81 10/21/2014     (H) 10/21/2014     (H) 04/29/2013     COAGS: No results found for: PTT, INR, FIBR  POC: No results found for: BGM, HCG, HCGS  HEPATIC:   Lab Results   Component Value Date    ALBUMIN 3.9 10/21/2014    PROTTOTAL 7.2 10/21/2014    ALT 47 07/11/2016    AST 26 07/11/2016    ALKPHOS 84 10/21/2014    BILITOTAL 0.5 10/21/2014     OTHER:   Lab Results   Component Value Date    A1C 5.8 11/17/2010    PAULINA 9.0 10/21/2014       Anesthesia Plan    ASA Status:  3   NPO Status:  NPO Appropriate    Anesthesia Type: MAC.     - Reason for MAC: immobility needed, straight local not clinically adequate   Induction: Propofol.   Maintenance: TIVA.        Consents    Anesthesia Plan(s) and associated risks, benefits, and realistic alternatives discussed. Questions answered and patient/representative(s) expressed understanding.     - Discussed: Risks, Benefits and Alternatives for BOTH SEDATION and the PROCEDURE were discussed     - Discussed with:  Patient         Postoperative Care    Pain management: IV analgesics, Multi-modal analgesia.   PONV prophylaxis: Ondansetron (or other 5HT-3), Background Propofol Infusion     Comments:                Jonathan Castillo MD

## 2022-12-19 NOTE — ANESTHESIA POSTPROCEDURE EVALUATION
Patient: Miguelangel Wren    Procedure: Procedure(s):  SIGMOIDOSCOPY, FLEXIBLE  CYSTOSCOPY WITH CYSTOGRAM       Anesthesia Type:  General    Note:  Disposition: Outpatient   Postop Pain Control: Uneventful            Sign Out: Well controlled pain   PONV: No   Neuro/Psych: Uneventful            Sign Out: Acceptable/Baseline neuro status   Airway/Respiratory: Uneventful            Sign Out: Acceptable/Baseline resp. status   CV/Hemodynamics: Uneventful            Sign Out: Acceptable CV status; No obvious hypovolemia; No obvious fluid overload   Other NRE: NONE   DID A NON-ROUTINE EVENT OCCUR? No           Last vitals:  Vitals Value Taken Time   /79 12/19/22 1030   Temp 36.1  C (97  F) 12/19/22 1030   Pulse 78 12/19/22 1043   Resp 16 12/19/22 1043   SpO2 96 % 12/19/22 1043   Vitals shown include unvalidated device data.    Electronically Signed By: Adebayo Sky MD  December 19, 2022  10:52 AM

## 2022-12-19 NOTE — ANESTHESIA PROCEDURE NOTES
Airway       Patient location during procedure: OR  Staff -        CRNA: Callie Wood APRN CRNA       Performed By: CRNA  Consent for Airway        Urgency: elective  Indications and Patient Condition       Indications for airway management: tanja-procedural       Induction type:intravenous       Mask difficulty assessment: 0 - not attempted    Final Airway Details       Final airway type: supraglottic airway    Supraglottic Airway Details        Type: LMA       Brand: LMA Unique       LMA size: 5    Post intubation assessment        Placement verified by: capnometry, equal breath sounds and chest rise        Number of attempts at approach: 1       Secured with: silk tape       Ease of procedure: easy       Dentition: Intact and Unchanged    Additional Comments       Converted to general. 20 mg Succinylcholine needed to achieve apnea. LMA inserted with no complications

## 2022-12-19 NOTE — ANESTHESIA CARE TRANSFER NOTE
Patient: Miguelangel Wren    Procedure: Procedure(s):  SIGMOIDOSCOPY, FLEXIBLE  CYSTOSCOPY WITH CYSTOGRAM       Diagnosis: Colovesical fistula [N32.1]  Diagnosis Additional Information: No value filed.    Anesthesia Type:   General     Note:    Oropharynx: oropharynx clear of all foreign objects and spontaneously breathing  Level of Consciousness: awake  Oxygen Supplementation: face mask  Level of Supplemental Oxygen (L/min / FiO2): 5  Independent Airway: airway patency satisfactory and stable  Dentition: dentition unchanged  Vital Signs Stable: post-procedure vital signs reviewed and stable  Report to RN Given: handoff report given  Patient transferred to: PACU    Handoff Report: Identifed the Patient, Identified the Reponsible Provider, Reviewed the pertinent medical history, Discussed the surgical course, Reviewed Intra-OP anesthesia mangement and issues during anesthesia, Set expectations for post-procedure period and Allowed opportunity for questions and acknowledgement of understanding      Vitals:  Vitals Value Taken Time   BP     Temp     Pulse     Resp     SpO2 100 % 12/19/22 1005   Vitals shown include unvalidated device data.    Electronically Signed By: SARAH Maldonado CRNA  December 19, 2022  10:07 AM

## 2022-12-19 NOTE — BRIEF OP NOTE
[facility]    Brief Operative Note    Pre-operative diagnosis: Colovesical fistula [N32.1]  Post-operative diagnosis Same as pre-operative diagnosis    Procedure: Procedure(s):  SIGMOIDOSCOPY, FLEXIBLE  CYSTOSCOPY WITH CYSTOGRAM  Surgeon: Surgeon(s) and Role:  Panel 1:     * Davidson Osei MD - Primary     * Greg Bonds MD - Fellow - Assisting  Panel 2:     * Itzel Duran MD - Primary      *Subhash Wills MD - Assisting   Anesthesia: General   Estimated Blood Loss: Less than 10 ml    Drains: 16Fr urethral catheter with 10cc in the balloon  Specimens: * No specimens in log *  Findings:   No obvious fistula under visual or fluoroscopic guidance .  Complications: None.  Implants: * No implants in log *

## 2022-12-19 NOTE — TELEPHONE ENCOUNTER
Cultures came in for this patient-        Patient has been taking bactrim since Friday as instructed. Culture came back as gram negative bacilli then while speaking with someone from [facility]' yesterday the result was edited to ecoli. Sent message to Dr Duran just now letting her know since patient is scheduled this morning for cystoscopy.    Aleida Encarnacion RN, BSN  Care Coordinator Urology

## 2022-12-21 ENCOUNTER — APPOINTMENT (OUTPATIENT)
Dept: UROLOGY | Facility: CLINIC | Age: 52
End: 2022-12-21
Payer: COMMERCIAL

## 2022-12-23 LAB — COLONOSCOPY: NORMAL

## 2023-06-01 ENCOUNTER — HEALTH MAINTENANCE LETTER (OUTPATIENT)
Age: 53
End: 2023-06-01

## 2024-06-16 ENCOUNTER — HEALTH MAINTENANCE LETTER (OUTPATIENT)
Age: 54
End: 2024-06-16

## 2025-06-21 ENCOUNTER — HEALTH MAINTENANCE LETTER (OUTPATIENT)
Age: 55
End: 2025-06-21

## (undated) DEVICE — ENDO CAP AND TUBING STERILE FOR ENDOGATOR  100130

## (undated) DEVICE — GUIDEWIRE SENSOR DUAL FLEX STR 0.035"X150CM M0066703080

## (undated) DEVICE — WIPE PREMOIST CLEANSING WASHCLOTHS 7988

## (undated) DEVICE — PAD CHUX UNDERPAD 23X24" 7136

## (undated) DEVICE — SYR 10ML LL W/O NDL 302995

## (undated) DEVICE — SOL NACL 0.9% IRRIG 3000ML BAG 2B7477

## (undated) DEVICE — ATTACHMENT CAP DISTAL REVEAL 11.8MM BX00711771

## (undated) DEVICE — LINEN TOWEL PACK X5 5464

## (undated) DEVICE — SOL WATER IRRIG 1000ML BOTTLE 2F7114

## (undated) DEVICE — SOL NACL 0.9% IRRIG 1000ML BOTTLE 2F7124

## (undated) DEVICE — DRAPE C-ARM W/STRAPS 42X72" 07-CA104

## (undated) DEVICE — ENDO TUBING CO2 SMARTCAP STERILE DISP 100145CO2EXT

## (undated) DEVICE — PACK CYSTO UMMC CUSTOM

## (undated) DEVICE — GLOVE PROTEXIS W/NEU-THERA 6.5  2D73TE65

## (undated) DEVICE — KIT ENDO FIRST STEP DISINFECTANT 200ML W/POUCH EP-4

## (undated) DEVICE — SUCTION MANIFOLD NEPTUNE 2 SYS 4 PORT 0702-020-000

## (undated) DEVICE — KIT CONNECTOR FOR OLYMPUS ENDOSCOPES DEFENDO 100310

## (undated) DEVICE — PACK ENDOSCOPY GI CUSTOM UMMC

## (undated) DEVICE — SYR 30ML LL W/O NDL 302832

## (undated) DEVICE — WIRE GUIDE 0.035"X145CM BENTSON TSFB-35-145-BH

## (undated) RX ORDER — CEFAZOLIN SODIUM/WATER 2 G/20 ML
SYRINGE (ML) INTRAVENOUS
Status: DISPENSED
Start: 2022-12-19

## (undated) RX ORDER — HYDROMORPHONE HYDROCHLORIDE 1 MG/ML
INJECTION, SOLUTION INTRAMUSCULAR; INTRAVENOUS; SUBCUTANEOUS
Status: DISPENSED
Start: 2022-12-19

## (undated) RX ORDER — FENTANYL CITRATE-0.9 % NACL/PF 10 MCG/ML
PLASTIC BAG, INJECTION (ML) INTRAVENOUS
Status: DISPENSED
Start: 2022-12-19

## (undated) RX ORDER — FENTANYL CITRATE 50 UG/ML
INJECTION, SOLUTION INTRAMUSCULAR; INTRAVENOUS
Status: DISPENSED
Start: 2022-12-19

## (undated) RX ORDER — ESMOLOL HYDROCHLORIDE 10 MG/ML
INJECTION INTRAVENOUS
Status: DISPENSED
Start: 2022-12-19

## (undated) RX ORDER — PROPOFOL 10 MG/ML
INJECTION, EMULSION INTRAVENOUS
Status: DISPENSED
Start: 2022-12-19

## (undated) RX ORDER — ONDANSETRON 2 MG/ML
INJECTION INTRAMUSCULAR; INTRAVENOUS
Status: DISPENSED
Start: 2022-12-19